# Patient Record
Sex: FEMALE | Race: BLACK OR AFRICAN AMERICAN | NOT HISPANIC OR LATINO | Employment: UNEMPLOYED | URBAN - METROPOLITAN AREA
[De-identification: names, ages, dates, MRNs, and addresses within clinical notes are randomized per-mention and may not be internally consistent; named-entity substitution may affect disease eponyms.]

---

## 2018-08-20 ENCOUNTER — HOSPITAL ENCOUNTER (EMERGENCY)
Facility: HOSPITAL | Age: 28
Discharge: HOME | End: 2018-08-20
Attending: EMERGENCY MEDICINE
Payer: MEDICAID

## 2018-08-20 VITALS
SYSTOLIC BLOOD PRESSURE: 115 MMHG | TEMPERATURE: 99 F | RESPIRATION RATE: 18 BRPM | BODY MASS INDEX: 35.61 KG/M2 | HEIGHT: 68 IN | WEIGHT: 235 LBS | HEART RATE: 61 BPM | DIASTOLIC BLOOD PRESSURE: 70 MMHG | OXYGEN SATURATION: 99 %

## 2018-08-20 DIAGNOSIS — R07.9 CHEST PAIN: ICD-10-CM

## 2018-08-20 DIAGNOSIS — F41.1 GENERALIZED ANXIETY DISORDER: Primary | ICD-10-CM

## 2018-08-20 LAB
ALBUMIN SERPL BCP-MCNC: 4.1 G/DL
ALP SERPL-CCNC: 73 U/L
ALT SERPL W/O P-5'-P-CCNC: 9 U/L
ANION GAP SERPL CALC-SCNC: 8 MMOL/L
AST SERPL-CCNC: 15 U/L
BASOPHILS # BLD AUTO: 0.02 K/UL
BASOPHILS NFR BLD: 0.4 %
BILIRUB SERPL-MCNC: 0.4 MG/DL
BUN SERPL-MCNC: 4 MG/DL
CALCIUM SERPL-MCNC: 9.8 MG/DL
CHLORIDE SERPL-SCNC: 105 MMOL/L
CO2 SERPL-SCNC: 25 MMOL/L
CREAT SERPL-MCNC: 0.8 MG/DL
DIFFERENTIAL METHOD: ABNORMAL
EOSINOPHIL # BLD AUTO: 0.2 K/UL
EOSINOPHIL NFR BLD: 4.5 %
ERYTHROCYTE [DISTWIDTH] IN BLOOD BY AUTOMATED COUNT: 13.9 %
EST. GFR  (AFRICAN AMERICAN): >60 ML/MIN/1.73 M^2
EST. GFR  (NON AFRICAN AMERICAN): >60 ML/MIN/1.73 M^2
GLUCOSE SERPL-MCNC: 89 MG/DL
HCT VFR BLD AUTO: 36.1 %
HGB BLD-MCNC: 11.7 G/DL
LYMPHOCYTES # BLD AUTO: 2.3 K/UL
LYMPHOCYTES NFR BLD: 43.2 %
MCH RBC QN AUTO: 27.5 PG
MCHC RBC AUTO-ENTMCNC: 32.4 G/DL
MCV RBC AUTO: 85 FL
MONOCYTES # BLD AUTO: 0.5 K/UL
MONOCYTES NFR BLD: 8.4 %
NEUTROPHILS # BLD AUTO: 2.3 K/UL
NEUTROPHILS NFR BLD: 43.5 %
PLATELET # BLD AUTO: 335 K/UL
PMV BLD AUTO: 9 FL
POTASSIUM SERPL-SCNC: 3.8 MMOL/L
PROT SERPL-MCNC: 7.9 G/DL
RBC # BLD AUTO: 4.26 M/UL
SODIUM SERPL-SCNC: 138 MMOL/L
TROPONIN I SERPL DL<=0.01 NG/ML-MCNC: <0.006 NG/ML
WBC # BLD AUTO: 5.33 K/UL

## 2018-08-20 PROCEDURE — 84484 ASSAY OF TROPONIN QUANT: CPT

## 2018-08-20 PROCEDURE — 93005 ELECTROCARDIOGRAM TRACING: CPT

## 2018-08-20 PROCEDURE — 93010 ELECTROCARDIOGRAM REPORT: CPT | Mod: ,,, | Performed by: INTERNAL MEDICINE

## 2018-08-20 PROCEDURE — 99284 EMERGENCY DEPT VISIT MOD MDM: CPT | Mod: 25

## 2018-08-20 PROCEDURE — 85025 COMPLETE CBC W/AUTO DIFF WBC: CPT

## 2018-08-20 PROCEDURE — 80053 COMPREHEN METABOLIC PANEL: CPT

## 2018-08-20 RX ORDER — SERTRALINE HYDROCHLORIDE 50 MG/1
50 TABLET, FILM COATED ORAL DAILY
COMMUNITY

## 2018-08-20 RX ORDER — BUSPIRONE HYDROCHLORIDE 10 MG/1
10 TABLET ORAL 3 TIMES DAILY
COMMUNITY

## 2018-08-20 RX ORDER — ALPRAZOLAM 0.5 MG/1
0.5 TABLET ORAL 3 TIMES DAILY
COMMUNITY

## 2018-08-20 RX ORDER — PROPRANOLOL HYDROCHLORIDE 10 MG/1
10 TABLET ORAL 3 TIMES DAILY
COMMUNITY

## 2018-08-20 NOTE — ED TRIAGE NOTES
Patient presents to the ED via personal vehicle alone. Patient reports chest pain since last night, and sob since this morning. Pt reports a 7/10, constant, left sided, CP that gets worse with breathing in, and radiates to left shoulder. Patient states that she feels dizzy. Denies nausea, vomiting. Patient states that she has a hx of anxiety and recently started taking new medications a couple of days ago.

## 2018-08-20 NOTE — DISCHARGE INSTRUCTIONS
"Return to the Emergency Department of any acute worsening of your symptoms or for any other concern.     You should return to the ED for fever/chills, shortness of breath, chest pain, weakness or "passing out".     Pt should take all medications as prescribed.    Pt should follow up with PCP as soon as possible.    The risks associated with not taking your medications as prescribed and not following up with your Primary Care doctor or sub specialist includes worsening of your condition, pain, disability, loss of function or livelihood, and death      ELIAS Teresa M.D. 3:46 PM 8/20/2018      Our goal in the emergency department is to always give you outstanding care and exceptional service. You may receive a survey by mail or e-mail in the next week regarding your experience in our ED. We would greatly appreciate your completing and returning the survey. Your feedback provides us with a way to recognize our staff who give very good care and it helps us learn how to improve when your experience was below our aspiration of excellence.     "

## 2018-08-20 NOTE — ED PROVIDER NOTES
Encounter Date: 8/20/2018    SCRIBE #1 NOTE: I, Jagdeep Sanderson, am scribing for, and in the presence of,  Christopher Teresa MD. I have scribed the following portions of the note - Other sections scribed: HPI, ROS.       History     Chief Complaint   Patient presents with    Chest Pain     Pt reports sudden onset of dizziness and chest pain. Reports she has panic attacks but this does not feel the same.      CC: Chest Pain    HPI: 27 y/o female with hx of anxiety presents for emergent consideration of chest pain that began last night. Pt reports that she typically takes xanax before bed and wakes up feeling better. However, after going to sleep last night with this pain she woke up still having it. She also notes sudden onset of dizziness when trying to stand up. Pt states the pain is exacerbated by deep inhalation. She does not attribute her current symptoms to an anxiety attack, stating she does not have chest pain during these episodes. The pt also notes that she typically paces or uses breathing techniques to relieve her anxiety. Pt denies personal hx of drug use. Pt denies diaphoresis and SOB. Pain is reproducible on palpation of the chest wall.       The history is provided by the patient. No  was used.     Review of patient's allergies indicates:  No Known Allergies  Past Medical History:   Diagnosis Date    Anxiety      Past Surgical History:   Procedure Laterality Date    INCISION AND DRAINAGE OF WOUND      bilateral auxillary     History reviewed. No pertinent family history.  Social History     Tobacco Use    Smoking status: Never Smoker   Substance Use Topics    Alcohol use: Yes     Comment: occasionally    Drug use: No     Review of Systems   Constitutional: Negative for chills and fever.   HENT: Negative for congestion, ear pain, rhinorrhea and sore throat.    Eyes: Negative for pain.   Respiratory: Negative for cough and shortness of breath.    Cardiovascular: Positive for  chest pain.   Gastrointestinal: Negative for abdominal pain, diarrhea, nausea and vomiting.   Genitourinary: Negative for dysuria, flank pain and urgency.   Musculoskeletal: Negative for back pain and neck pain.   Skin: Negative for rash.   Neurological: Positive for dizziness. Negative for weakness, light-headedness and headaches.   All other systems reviewed and are negative.      Physical Exam     Initial Vitals [08/20/18 1331]   BP Pulse Resp Temp SpO2   111/75 78 18 98.2 °F (36.8 °C) 100 %      MAP       --         Physical Exam    Vitals reviewed.  Constitutional: She appears well-developed and well-nourished.   HENT:   Head: Normocephalic and atraumatic.   Nose: Nose normal.   Mouth/Throat: No oropharyngeal exudate.   Eyes: EOM are normal. Pupils are equal, round, and reactive to light.   Neck: Normal range of motion. Neck supple. No JVD present.   Cardiovascular: Regular rhythm and normal heart sounds. Exam reveals no gallop and no friction rub.    No murmur heard.  Pulmonary/Chest: Breath sounds normal. No stridor. No respiratory distress. She has no wheezes. She has no rhonchi. She has no rales. She exhibits tenderness.   Abdominal: Soft. Bowel sounds are normal. She exhibits no distension and no mass. There is no tenderness. There is no rebound and no guarding.   Musculoskeletal: Normal range of motion. She exhibits no edema or tenderness.   Neurological: She is alert and oriented to person, place, and time. She has normal strength. No sensory deficit.   Skin: Skin is warm and dry.   Psychiatric: Her speech is normal and behavior is normal. Thought content normal. Her mood appears anxious. Cognition and memory are normal.         ED Course   Procedures  Labs Reviewed   CBC W/ AUTO DIFFERENTIAL - Abnormal; Notable for the following components:       Result Value    Hemoglobin 11.7 (*)     Hematocrit 36.1 (*)     MPV 9.0 (*)     All other components within normal limits   COMPREHENSIVE METABOLIC PANEL -  Abnormal; Notable for the following components:    BUN, Bld 4 (*)     ALT 9 (*)     All other components within normal limits   TROPONIN I   POCT URINE PREGNANCY     EKG Readings: (Independently Interpreted)   Initial Reading: No STEMI. Rhythm: Normal Sinus Rhythm. Ectopy: No Ectopy. Conduction: Normal. ST Segments: Normal ST Segments. T Waves: Normal. Clinical Impression: Normal Sinus Rhythm       Imaging Results          X-Ray Chest PA And Lateral (Final result)  Result time 08/20/18 14:21:16    Final result by Cullen Mayo MD (08/20/18 14:21:16)                 Impression:      No acute chest disease identified.      Electronically signed by: Cullen Mayo MD  Date:    08/20/2018  Time:    14:21             Narrative:    EXAMINATION:  XR CHEST PA AND LATERAL    CLINICAL HISTORY:  Chest pain, unspecified    TECHNIQUE:  PA and lateral views of the chest were performed.    COMPARISON:  None    FINDINGS:  The cardiac silhouette and superior mediastinal structures are unremarkable. Pulmonary vasculature is within normal limits. The lungs are well aerated and free of focal consolidations. There is no evidence for pneumothorax or pleural effusions. Bony structures are grossly intact.                              X-Rays:   Independently Interpreted Readings:   Chest X-Ray: Normal heart size.  No infiltrates.  No acute abnormalities.     Medical Decision Making:   History:   Old Medical Records: I decided to obtain old medical records.  Initial Assessment:   Medical decision-making:    The patient received a medical screening exam. If performed, the EKG was independently evaluated by me and is pending final cardiology evaluation.  If performed, all radiographic studies were independently evaluated by me and are pending final radiology evaluation. If labs were ordered, they were reviewed. Vital signs are independently assessed by me.  If performed, the pulse oximetry was independently evaluated by me.  I decided to  obtain the patient's past medical record.  If available, I reviewed the patient's past medical record, including most recent labs and radiology reports.    ED Management:  This is an emergent evaluation for a patient with atypical chest pain and anxiety.The patient's pain is atypical for cardiac etiology.  I decided to obtain and review the patient's past medical record.        The vital signs are stable in the room.    EKG is normal.  There is no evidence of STEMI or ischemia.    CXR is negative for pneumonia, pneumothorax and edema.  Troponin is negative and was drawn at least 8 hours since the onset of pain.  I doubt ACS.  There is no evidence of congestive heart failure.  The electrolytes are relatively normal.  The pt is not anemic.  I doubt PE.  I consider but doubt pulmonary embolism.  Patient is PERC negative with a Wells score of zero.     Currently the patient has a a non-diagnostic EKG with negative troponin in the emergency department.  I doubt acute coronary syndrome.  I did inform the patient that even with negative testing, we can never eliminate all risk.  I believe the patient is low risk with negative initial testing; they are appropriate for close outpatient follow-up.  The patient is aware of the small but persistent risk for MI/ACS with subsequent cardiac complications or death.  I have low suspicion for cardiopulmonary, vascular, infectious, respiratory, or other emergent medical condition based on my evaluation in the ED.     The patient's pain is currently improved.      The results and physical exam findings were reviewed with the patient. Pt agrees with assessment, disposition and treatment plan and has no further questions or complaints at this time.      ELIAS Teresa M.D. 3:47 PM 8/20/2018              Scribe Attestation:   Scribe #1: I performed the above scribed service and the documentation accurately describes the services I performed. I attest to the accuracy of the  note.    Attending Attestation:           Physician Attestation for Scribe:  Physician Attestation Statement for Scribe #1: I, Christopher Teresa MD, reviewed documentation, as scribed by Jagdeep Sanderson in my presence, and it is both accurate and complete.                    Clinical Impression:   The primary encounter diagnosis was Generalized anxiety disorder. A diagnosis of Atypical Chest pain was also pertinent to this visit.                             Christopher Teresa MD  08/20/18 1548       Christopher Teresa MD  08/20/18 1542

## 2019-05-01 ENCOUNTER — HOSPITAL ENCOUNTER (EMERGENCY)
Facility: HOSPITAL | Age: 29
Discharge: HOME | End: 2019-05-01
Attending: EMERGENCY MEDICINE
Payer: MEDICAID

## 2019-05-01 VITALS
BODY MASS INDEX: 38.34 KG/M2 | HEART RATE: 77 BPM | HEIGHT: 68 IN | WEIGHT: 253 LBS | RESPIRATION RATE: 18 BRPM | SYSTOLIC BLOOD PRESSURE: 120 MMHG | DIASTOLIC BLOOD PRESSURE: 61 MMHG | OXYGEN SATURATION: 99 % | TEMPERATURE: 97 F

## 2019-05-01 DIAGNOSIS — F41.9 ANXIETY: ICD-10-CM

## 2019-05-01 DIAGNOSIS — R07.89 BURNING CHEST PAIN: Primary | ICD-10-CM

## 2019-05-01 LAB
B-HCG UR QL: NEGATIVE
CTP QC/QA: YES

## 2019-05-01 PROCEDURE — 93005 ELECTROCARDIOGRAM TRACING: CPT

## 2019-05-01 PROCEDURE — 93010 ELECTROCARDIOGRAM REPORT: CPT | Mod: ,,, | Performed by: INTERNAL MEDICINE

## 2019-05-01 PROCEDURE — 99284 EMERGENCY DEPT VISIT MOD MDM: CPT | Mod: 25

## 2019-05-01 PROCEDURE — 93010 EKG 12-LEAD: ICD-10-PCS | Mod: ,,, | Performed by: INTERNAL MEDICINE

## 2019-05-01 PROCEDURE — 25000003 PHARM REV CODE 250: Performed by: PHYSICIAN ASSISTANT

## 2019-05-01 PROCEDURE — 81025 URINE PREGNANCY TEST: CPT | Performed by: NURSE PRACTITIONER

## 2019-05-01 RX ORDER — ALPRAZOLAM 0.25 MG/1
0.25 TABLET ORAL
Status: COMPLETED | OUTPATIENT
Start: 2019-05-01 | End: 2019-05-01

## 2019-05-01 RX ORDER — FAMOTIDINE 20 MG/1
20 TABLET, FILM COATED ORAL 2 TIMES DAILY
Qty: 20 TABLET | Refills: 0 | Status: SHIPPED | OUTPATIENT
Start: 2019-05-01 | End: 2020-04-30

## 2019-05-01 RX ORDER — HYDROXYZINE HYDROCHLORIDE 25 MG/1
25 TABLET, FILM COATED ORAL 3 TIMES DAILY
COMMUNITY

## 2019-05-01 RX ADMIN — LIDOCAINE HYDROCHLORIDE: 20 SOLUTION ORAL; TOPICAL at 01:05

## 2019-05-01 RX ADMIN — ALPRAZOLAM 0.25 MG: 0.25 TABLET ORAL at 01:05

## 2019-05-01 NOTE — ED TRIAGE NOTES
Patient arrived via EMS, reports headache x 2 days, difficulty breathing, weakness. Has hx of anxiety, took Hydroxyzine 30 minutes PTA EMS arrival. States she has been out of her regular anxiety meds for a while. Normally takes Zoloft, Wellbutrin, and Xananx.

## 2019-05-01 NOTE — ED PROVIDER NOTES
Encounter Date: 5/1/2019       History     Chief Complaint   Patient presents with    Anxiety     ANXIETY ATTACK STARTING THIS MORNING, HAS BEEN OUT OF MEDS FOR A WHILE AND IS UNDER ALOT OF STRESS      29 y/o female with history of anxiety presents to the ER with chief complaint of intermittent anxiety for 2-3 months.  She reports chest heaviness that occurred when she was laying down.  She had shortness of breath for 3 hours, but this has now resolved.    She says she attempted breathing methods and took hydroxyzine.  She says she felt calm for a short period of time.  She says her family and fiance were not answering their found so she felt more panicked.    She is a  and feels stressed with work and also has 5 kids at home.  She is prescribed sertraline daily and hydroxyzine prn. , but found that her symptoms were better controlled when she used xanax .25 as needed.    She describes burning pain in the chest and cramping         Review of patient's allergies indicates:  No Known Allergies  Past Medical History:   Diagnosis Date    Anxiety      Past Surgical History:   Procedure Laterality Date    INCISION AND DRAINAGE OF WOUND      bilateral auxillary     History reviewed. No pertinent family history.  Social History     Tobacco Use    Smoking status: Never Smoker   Substance Use Topics    Alcohol use: Yes     Comment: occasionally    Drug use: No     Review of Systems   Constitutional: Negative for chills and fever.   HENT: Negative for sore throat.    Respiratory: Positive for shortness of breath. Negative for chest tightness and wheezing.    Cardiovascular: Positive for chest pain (chest pressure and burning ).   Gastrointestinal: Negative for abdominal pain, nausea and vomiting.   Genitourinary: Negative for dysuria.   Musculoskeletal: Positive for myalgias. Negative for back pain.   Skin: Negative for rash.   Neurological: Negative for weakness.   Hematological: Does not bruise/bleed easily.    Psychiatric/Behavioral: Negative for confusion, self-injury, sleep disturbance and suicidal ideas. The patient is nervous/anxious.        Physical Exam     Initial Vitals [05/01/19 1013]   BP Pulse Resp Temp SpO2   (!) 140/80 96 20 98.8 °F (37.1 °C) 100 %      MAP       --         Physical Exam    Nursing note and vitals reviewed.  Constitutional: She appears well-developed and well-nourished.   HENT:   Head: Atraumatic.   Mouth/Throat: Oropharynx is clear and moist.   Eyes: Conjunctivae and EOM are normal. Pupils are equal, round, and reactive to light.   Neck: Normal range of motion. Neck supple.   Cardiovascular: Normal rate, regular rhythm and intact distal pulses.   Pulmonary/Chest: Breath sounds normal. No respiratory distress. She has no wheezes. She has no rhonchi. She has no rales. She exhibits tenderness.   Abdominal: Soft. Bowel sounds are normal. There is no tenderness.   Neurological: She is alert and oriented to person, place, and time. She has normal strength.   Skin: Capillary refill takes less than 2 seconds. No rash noted.   Psychiatric: Her speech is normal and behavior is normal. Thought content normal. Her mood appears anxious. Her affect is not angry. She is not agitated and not aggressive. Thought content is not paranoid and not delusional. Cognition and memory are normal. She does not express impulsivity. She does not exhibit a depressed mood. She expresses no homicidal and no suicidal ideation.         ED Course   Procedures  Labs Reviewed   POCT URINE PREGNANCY          Imaging Results          X-Ray Chest PA And Lateral (Final result)  Result time 05/01/19 13:43:12    Final result by Gamaliel Dela Cruz MD (05/01/19 13:43:12)                 Impression:      1. No acute cardiopulmonary process.      Electronically signed by: Gamaliel Dela Cruz MD  Date:    05/01/2019  Time:    13:43             Narrative:    EXAMINATION:  XR CHEST PA AND LATERAL    CLINICAL HISTORY:  burning chest  pain;    TECHNIQUE:  PA and lateral views of the chest were performed.    COMPARISON:  08/20/2018    FINDINGS:  The cardiomediastinal silhouette is not enlarged.  There is no pleural effusion.  The trachea is midline.  The lungs are symmetrically expanded bilaterally without evidence of acute parenchymal process. No large focal consolidation seen.  There is no pneumothorax.  The osseous structures are unremarkable.                                       APC / Resident Notes:   Patient presents to the ER with complaint of chest pressure, cramping and burning pain, which occurred today before arrival.  She had shortness of breath, which she felt was similar to anxiety attacks in the past.  SOB has resolved.  The patient did not have significant improvement with hydroxyzine so she came to the ER for evaluation.  Patient appears very comfortable during my exam.  She admits to stress related to her work and home life.  She does not have depression, suicidal or homicidal ideation.  Patient's EKG shows normal sinus rhythm without evidence of acute ischemia.  Chest x-ray is negative for acute or infectious process.  Her presentation is not concerning for pneumonia, PE, ACS or other acute cardiopulmonary process.    I have considered GERD/gastritis, as the patient reports history of GERD and describes a burning chest pain.  Patient's chest pain completely resolved with GI cocktail and Xanax given in the ED.  I will prescribe Pepcid to take at home and advised that she continue Vistaril as needed for anxiety and follow up closely with her PCP who prescribes her medications.  Patient is comfortable with this plan.  She is given ER return precautions.  I discussed the care this patient my supervising physician.         Attending Attestation:             Attending ED Notes:   I, Aby Correa, have reviewed the case with my PAX[facility] and agree with the history, review of systems, physical exam, assessment and plan of care as  documented by my advance practice clinician.  ROS as above and as addressed on PAX[facility] documentation.   All other systems reviewed and are negative.    I did not personally see the patient but was available for consultation.    Labs and imaging studies reviewed and independently Interpreted:  Unremarkable urine pregnancy test and chest x-ray.    The results and physical exam findings were reviewed with the patient. Pt agrees with assessment, disposition and treatment plan and has no further questions or complaints at this time.    MAKEDA Correa M.D.           ED Course as of May 01 1710   Wed May 01, 2019   1438 X-Ray Chest PA And Lateral [NO]   1445 Unremarkable   Preg Test, Ur: Negative [NO]   1445 No acute disease   X-Ray Chest PA And Lateral [NO]      ED Course User Index  [NO] Aby Correa MD     Clinical Impression:       ICD-10-CM ICD-9-CM   1. Burning chest pain R07.89 786.59   2. Anxiety F41.9 300.00                                ANA Alcantara  05/01/19 0130

## 2022-04-19 ENCOUNTER — HOSPITAL ENCOUNTER (EMERGENCY)
Facility: HOSPITAL | Age: 32
Discharge: HOME | End: 2022-04-20
Attending: EMERGENCY MEDICINE
Payer: MEDICAID

## 2022-04-19 DIAGNOSIS — N30.00 ACUTE CYSTITIS WITHOUT HEMATURIA: ICD-10-CM

## 2022-04-19 DIAGNOSIS — O03.4 RETAINED PRODUCTS OF CONCEPTION AFTER MISCARRIAGE: Primary | ICD-10-CM

## 2022-04-19 DIAGNOSIS — E87.6 HYPOKALEMIA: ICD-10-CM

## 2022-04-19 DIAGNOSIS — N89.8 VAGINAL DISCHARGE: ICD-10-CM

## 2022-04-19 DIAGNOSIS — N76.0 BV (BACTERIAL VAGINOSIS): ICD-10-CM

## 2022-04-19 DIAGNOSIS — R06.02 SOB (SHORTNESS OF BREATH): ICD-10-CM

## 2022-04-19 DIAGNOSIS — R10.2 PELVIC PAIN: ICD-10-CM

## 2022-04-19 DIAGNOSIS — S83.91XA SPRAIN OF RIGHT KNEE, UNSPECIFIED LIGAMENT, INITIAL ENCOUNTER: ICD-10-CM

## 2022-04-19 DIAGNOSIS — F41.9 ANXIETY: ICD-10-CM

## 2022-04-19 DIAGNOSIS — B96.89 BV (BACTERIAL VAGINOSIS): ICD-10-CM

## 2022-04-19 DIAGNOSIS — D64.9 ANEMIA, UNSPECIFIED TYPE: ICD-10-CM

## 2022-04-19 DIAGNOSIS — R07.9 CHEST PAIN: ICD-10-CM

## 2022-04-19 PROCEDURE — 93005 ELECTROCARDIOGRAM TRACING: CPT | Performed by: SPECIALIST

## 2022-04-19 PROCEDURE — 93010 EKG 12-LEAD: ICD-10-PCS | Mod: ,,, | Performed by: SPECIALIST

## 2022-04-19 PROCEDURE — 99285 EMERGENCY DEPT VISIT HI MDM: CPT | Mod: 25

## 2022-04-19 PROCEDURE — 93010 ELECTROCARDIOGRAM REPORT: CPT | Mod: ,,, | Performed by: SPECIALIST

## 2022-04-19 PROCEDURE — 81025 URINE PREGNANCY TEST: CPT | Performed by: EMERGENCY MEDICINE

## 2022-04-20 VITALS
TEMPERATURE: 98 F | RESPIRATION RATE: 20 BRPM | HEART RATE: 87 BPM | BODY MASS INDEX: 38.95 KG/M2 | OXYGEN SATURATION: 100 % | DIASTOLIC BLOOD PRESSURE: 85 MMHG | SYSTOLIC BLOOD PRESSURE: 142 MMHG | WEIGHT: 257 LBS | HEIGHT: 68 IN

## 2022-04-20 LAB
ABO + RH BLD: NORMAL
ALBUMIN SERPL BCP-MCNC: 4 G/DL (ref 3.5–5.2)
ALP SERPL-CCNC: 66 U/L (ref 55–135)
ALT SERPL W/O P-5'-P-CCNC: 16 U/L (ref 10–44)
ANION GAP SERPL CALC-SCNC: 7 MMOL/L (ref 8–16)
AST SERPL-CCNC: 18 U/L (ref 10–40)
B-HCG UR QL: POSITIVE
BACTERIA #/AREA URNS HPF: ABNORMAL /HPF
BASOPHILS # BLD AUTO: 0.03 K/UL (ref 0–0.2)
BASOPHILS NFR BLD: 0.5 % (ref 0–1.9)
BILIRUB SERPL-MCNC: 0.3 MG/DL (ref 0.1–1)
BILIRUB UR QL STRIP: NEGATIVE
BUN SERPL-MCNC: 6 MG/DL (ref 6–20)
CALCIUM SERPL-MCNC: 9.2 MG/DL (ref 8.7–10.5)
CHLORIDE SERPL-SCNC: 104 MMOL/L (ref 95–110)
CLARITY UR: ABNORMAL
CLUE CELLS VAG QL WET PREP: ABNORMAL
CO2 SERPL-SCNC: 26 MMOL/L (ref 23–29)
COLOR UR: YELLOW
CREAT SERPL-MCNC: 0.7 MG/DL (ref 0.5–1.4)
CTP QC/QA: YES
DIFFERENTIAL METHOD: ABNORMAL
EOSINOPHIL # BLD AUTO: 0.4 K/UL (ref 0–0.5)
EOSINOPHIL NFR BLD: 7.1 % (ref 0–8)
ERYTHROCYTE [DISTWIDTH] IN BLOOD BY AUTOMATED COUNT: 13.6 % (ref 11.5–14.5)
EST. GFR  (AFRICAN AMERICAN): >60 ML/MIN/1.73 M^2
EST. GFR  (NON AFRICAN AMERICAN): >60 ML/MIN/1.73 M^2
FILAMENT FUNGI VAG WET PREP-#/AREA: ABNORMAL
GLUCOSE SERPL-MCNC: 93 MG/DL (ref 70–110)
GLUCOSE UR QL STRIP: NEGATIVE
HCG INTACT+B SERPL-ACNC: 222 MIU/ML
HCT VFR BLD AUTO: 34.5 % (ref 37–48.5)
HGB BLD-MCNC: 11 G/DL (ref 12–16)
HGB UR QL STRIP: ABNORMAL
HYALINE CASTS #/AREA URNS LPF: 32 /LPF
IMM GRANULOCYTES # BLD AUTO: 0.01 K/UL (ref 0–0.04)
IMM GRANULOCYTES NFR BLD AUTO: 0.2 % (ref 0–0.5)
KETONES UR QL STRIP: NEGATIVE
LEUKOCYTE ESTERASE UR QL STRIP: ABNORMAL
LYMPHOCYTES # BLD AUTO: 3.1 K/UL (ref 1–4.8)
LYMPHOCYTES NFR BLD: 51.4 % (ref 18–48)
MCH RBC QN AUTO: 27.1 PG (ref 27–31)
MCHC RBC AUTO-ENTMCNC: 31.9 G/DL (ref 32–36)
MCV RBC AUTO: 85 FL (ref 82–98)
MICROSCOPIC COMMENT: ABNORMAL
MONOCYTES # BLD AUTO: 0.5 K/UL (ref 0.3–1)
MONOCYTES NFR BLD: 8.6 % (ref 4–15)
NEUTROPHILS # BLD AUTO: 1.9 K/UL (ref 1.8–7.7)
NEUTROPHILS NFR BLD: 32.2 % (ref 38–73)
NITRITE UR QL STRIP: NEGATIVE
NRBC BLD-RTO: 0 /100 WBC
PH UR STRIP: 6 [PH] (ref 5–8)
PLATELET # BLD AUTO: 390 K/UL (ref 150–450)
PMV BLD AUTO: 8.8 FL (ref 9.2–12.9)
POTASSIUM SERPL-SCNC: 3.3 MMOL/L (ref 3.5–5.1)
PROT SERPL-MCNC: 7.7 G/DL (ref 6–8.4)
PROT UR QL STRIP: ABNORMAL
RBC # BLD AUTO: 4.06 M/UL (ref 4–5.4)
RBC #/AREA URNS HPF: 2 /HPF (ref 0–4)
SARS-COV-2 RDRP RESP QL NAA+PROBE: NEGATIVE
SODIUM SERPL-SCNC: 137 MMOL/L (ref 136–145)
SP GR UR STRIP: 1.03 (ref 1–1.03)
SPECIMEN SOURCE: ABNORMAL
SQUAMOUS #/AREA URNS HPF: 6 /HPF
T VAGINALIS GENITAL QL WET PREP: ABNORMAL
TROPONIN I SERPL DL<=0.01 NG/ML-MCNC: <0.03 NG/ML
URN SPEC COLLECT METH UR: ABNORMAL
UROBILINOGEN UR STRIP-ACNC: ABNORMAL EU/DL
WBC # BLD AUTO: 6.03 K/UL (ref 3.9–12.7)
WBC #/AREA URNS HPF: 22 /HPF (ref 0–5)
YEAST GENITAL QL WET PREP: ABNORMAL

## 2022-04-20 PROCEDURE — 96372 THER/PROPH/DIAG INJ SC/IM: CPT | Performed by: EMERGENCY MEDICINE

## 2022-04-20 PROCEDURE — 87205 SMEAR GRAM STAIN: CPT | Performed by: EMERGENCY MEDICINE

## 2022-04-20 PROCEDURE — 63600175 PHARM REV CODE 636 W HCPCS: Performed by: EMERGENCY MEDICINE

## 2022-04-20 PROCEDURE — 87591 N.GONORRHOEAE DNA AMP PROB: CPT | Performed by: EMERGENCY MEDICINE

## 2022-04-20 PROCEDURE — 87491 CHLMYD TRACH DNA AMP PROBE: CPT | Performed by: EMERGENCY MEDICINE

## 2022-04-20 PROCEDURE — 84484 ASSAY OF TROPONIN QUANT: CPT | Performed by: EMERGENCY MEDICINE

## 2022-04-20 PROCEDURE — 86901 BLOOD TYPING SEROLOGIC RH(D): CPT | Performed by: EMERGENCY MEDICINE

## 2022-04-20 PROCEDURE — 80053 COMPREHEN METABOLIC PANEL: CPT | Performed by: EMERGENCY MEDICINE

## 2022-04-20 PROCEDURE — 25000003 PHARM REV CODE 250: Performed by: EMERGENCY MEDICINE

## 2022-04-20 PROCEDURE — 85025 COMPLETE CBC W/AUTO DIFF WBC: CPT | Performed by: EMERGENCY MEDICINE

## 2022-04-20 PROCEDURE — U0002 COVID-19 LAB TEST NON-CDC: HCPCS | Performed by: EMERGENCY MEDICINE

## 2022-04-20 PROCEDURE — 87210 SMEAR WET MOUNT SALINE/INK: CPT | Performed by: EMERGENCY MEDICINE

## 2022-04-20 PROCEDURE — 96372 THER/PROPH/DIAG INJ SC/IM: CPT

## 2022-04-20 PROCEDURE — 87086 URINE CULTURE/COLONY COUNT: CPT | Performed by: EMERGENCY MEDICINE

## 2022-04-20 PROCEDURE — 36415 COLL VENOUS BLD VENIPUNCTURE: CPT | Performed by: EMERGENCY MEDICINE

## 2022-04-20 PROCEDURE — 81001 URINALYSIS AUTO W/SCOPE: CPT | Performed by: EMERGENCY MEDICINE

## 2022-04-20 PROCEDURE — 87081 CULTURE SCREEN ONLY: CPT | Performed by: EMERGENCY MEDICINE

## 2022-04-20 PROCEDURE — 84702 CHORIONIC GONADOTROPIN TEST: CPT | Performed by: EMERGENCY MEDICINE

## 2022-04-20 RX ORDER — METHYLERGONOVINE MALEATE 0.2 MG/ML
200 INJECTION INTRAVENOUS ONCE
Status: COMPLETED | OUTPATIENT
Start: 2022-04-20 | End: 2022-04-20

## 2022-04-20 RX ORDER — NAPROXEN 500 MG/1
500 TABLET ORAL 2 TIMES DAILY WITH MEALS
Qty: 20 TABLET | Refills: 0 | Status: SHIPPED | OUTPATIENT
Start: 2022-04-20 | End: 2022-04-30

## 2022-04-20 RX ORDER — CEPHALEXIN 500 MG/1
500 CAPSULE ORAL EVERY 6 HOURS
Qty: 28 CAPSULE | Refills: 0 | Status: SHIPPED | OUTPATIENT
Start: 2022-04-20 | End: 2022-04-27

## 2022-04-20 RX ORDER — ACETAMINOPHEN 500 MG
1000 TABLET ORAL
Status: COMPLETED | OUTPATIENT
Start: 2022-04-20 | End: 2022-04-20

## 2022-04-20 RX ORDER — METRONIDAZOLE 500 MG/1
500 TABLET ORAL 3 TIMES DAILY
Qty: 21 TABLET | Refills: 0 | Status: SHIPPED | OUTPATIENT
Start: 2022-04-20 | End: 2022-04-27

## 2022-04-20 RX ADMIN — ACETAMINOPHEN 1000 MG: 500 TABLET, FILM COATED ORAL at 01:04

## 2022-04-20 RX ADMIN — METHYLERGONOVINE MALEATE 200 MCG: 0.2 INJECTION, SOLUTION INTRAMUSCULAR; INTRAVENOUS at 03:04

## 2022-04-20 NOTE — DISCHARGE INSTRUCTIONS
Dr. Osullivan once you to go to the outpatient lab and have a beta hCG drawn on Friday.  I have ordered this as an outpatient lab test that can be done at [facility].  Call his office this morning, Wednesday morning, and make an appointment for next Tuesday for follow-up after an incomplete miscarriage with retained products of conception.  Please return to the ER for any new worsening symptoms such as fevers, worsening abdominal pain or pelvic pain, purulent discharge from the vagina or severe bleeding.  Or if you are feeling weak or dizzy.    You have also been prescribed Flagyl and Keflex to take for bacterial vaginosis and UTI.

## 2022-04-20 NOTE — ED NOTES
"Patient c/o foul smelling vaginal discharge. States discharge is white and smells like "spoiled milk".  States she had a miscarriage approx 2 weeks ago and vaginal bleeding stopped 4 days ago.  Patient admits to decreased appetite and intermittent epigastric pain.  Patient states she also wants her left knee checked.   Mild swelling noted to left knee.   "

## 2022-04-20 NOTE — ED PROVIDER NOTES
Encounter Date: 2022       History     Chief Complaint   Patient presents with    Chest Pain     X 2 weeks intermittent    Knee Pain     Right knee pain    Urinary Frequency     Foul odor to urine, no dysuria, miscarriage 2 weeks ago     31-year-old female  approx 3 months pregnant who believes herself to have had a miscarriage 2 weeks ago due to having a large amount of vaginal bleeding and passage of tissue.  Since that time the bleeding has decreased.  But she has noticed ongoing vaginal discharge with a foul odor is if it is not like spilled milk.  She reports she is having lower abdominal discomfort.  As well as vaginal pressure and discomfort.  She denies any active bleeding.  She denies any nausea or vomiting no fevers chills or sweats.  She reports she had a positive UPT in January.  Never saw OBGYN to confirm the pregnancy.  And never had ultrasound or blood work performed.  She also reports she has been having chest pain episodes that happened to her multiple times a day in her sharp in the left chest so typically lasts approximately 1 minute.  She reports associated with anxiety.  And she has this very frequently.  She reports when she takes deep breaths in calms down or takes a walk or stretcher chest that symptoms will improve.  She also reports intermittently feeling short of breath with most recent episode of feeling short of breath 1 week ago.  Most recent chest pain being driving to the ER.  She also complains of right knee pain that has been causing diff difficulty with ambulating since December.  At that time she reports she was walking in her right leg slipped out from under her.  She has noticed right knee swelling and pain with range of motion particularly when trying to flex the knee to walk stairs.  She has a stable gait.  She reports she is here in the ER at this time with multiple complaints because she has been living in [place] has been only in the area from 1 month due to  having to renovate her home and came tonight because her son is also being seen for chest pain.         Review of patient's allergies indicates:  No Known Allergies  Past Medical History:   Diagnosis Date    Anxiety      Past Surgical History:   Procedure Laterality Date    INCISION AND DRAINAGE OF WOUND      bilateral auxillary     No family history on file.  Social History     Tobacco Use    Smoking status: Never Smoker   Substance Use Topics    Alcohol use: Yes     Comment: occasionally    Drug use: No     Review of Systems   Constitutional: Negative for activity change, appetite change, chills, diaphoresis, fatigue and fever.   HENT: Negative for congestion, postnasal drip, rhinorrhea and sore throat.    Respiratory: Positive for shortness of breath. Negative for cough, chest tightness, wheezing and stridor.    Cardiovascular: Positive for chest pain and leg swelling. Negative for palpitations.   Gastrointestinal: Negative for abdominal distention, abdominal pain, blood in stool, constipation, diarrhea, nausea and vomiting.   Genitourinary: Positive for menstrual problem, pelvic pain, vaginal bleeding, vaginal discharge and vaginal pain. Negative for difficulty urinating, dysuria, flank pain, frequency, hematuria and urgency.   Musculoskeletal: Positive for arthralgias, gait problem, joint swelling and myalgias. Negative for back pain, neck pain and neck stiffness.   Skin: Negative for color change, pallor, rash and wound.   Neurological: Negative for dizziness, syncope, weakness, light-headedness and headaches.   Hematological: Does not bruise/bleed easily.   Psychiatric/Behavioral: Negative for confusion. The patient is not nervous/anxious.    All other systems reviewed and are negative.      Physical Exam     Initial Vitals [04/19/22 2336]   BP Pulse Resp Temp SpO2   (!) 149/91 89 16 98.1 °F (36.7 °C) 99 %      MAP       --         Physical Exam    Nursing note and vitals reviewed.  Constitutional: She  appears well-developed and well-nourished. She is not diaphoretic. No distress.   HENT:   Head: Normocephalic and atraumatic.   Right Ear: External ear normal.   Left Ear: External ear normal.   Nose: Nose normal.   Mouth/Throat: Oropharynx is clear and moist.   Eyes: Conjunctivae and EOM are normal. Pupils are equal, round, and reactive to light.   Neck: Neck supple. No tracheal deviation present.   Normal range of motion.  Cardiovascular: Normal rate, regular rhythm, normal heart sounds and intact distal pulses. Exam reveals no gallop and no friction rub.    No murmur heard.  Blood pressure 149/91 pulse 89   Pulmonary/Chest: Breath sounds normal. No stridor. No respiratory distress. She has no wheezes. She has no rhonchi. She has no rales. She exhibits no tenderness.   Clear breath sounds bilaterally sats 99% on room air respirations 16   Abdominal: Abdomen is soft. Bowel sounds are normal. She exhibits no distension and no mass. There is abdominal tenderness.   Mild suprapubic tenderness There is no rebound and no guarding.   Genitourinary:    Pelvic exam was performed with patient supine.   There is no rash, tenderness, lesion or injury on the right labia. There is no rash, tenderness, lesion or injury on the left labia. Uterus is not enlarged and not tender. Cervix exhibits discharge. Cervix exhibits no motion tenderness and no friability. Right adnexum displays no mass, no tenderness and no fullness. Left adnexum displays no mass, no tenderness and no fullness.    Vaginal discharge present.      No vaginal erythema, tenderness or bleeding.   No erythema, tenderness or bleeding in the vagina.    No foreign body in the vagina.      No signs of injury in the vagina.      Genitourinary Comments: Patient has a thin white to tan with mild signs of old blood discharge in the vaginal vault.  Cervix is closed.  No cervical motion tenderness.  No adnexal tenderness or fullness.     Musculoskeletal:         General:  Tenderness and edema present.      Cervical back: Normal range of motion and neck supple.      Right knee: Swelling present. No deformity, effusion, erythema, ecchymosis, lacerations, bony tenderness or crepitus. Decreased range of motion. Tenderness present over the medial joint line, lateral joint line and patellar tendon. Normal alignment and normal meniscus.      Instability Tests: Anterior drawer test negative. Posterior drawer test negative. Anterior Lachman test negative. Medial Chencho test negative and lateral Chencho test negative.      Left knee: Normal.        Legs:      Neurological: She is alert and oriented to person, place, and time. She has normal strength. No cranial nerve deficit or sensory deficit.   Skin: Skin is warm and dry. No rash noted. No erythema. No pallor.   Psychiatric: She has a normal mood and affect. Her behavior is normal. Judgment and thought content normal.         ED Course   Procedures  Labs Reviewed   VAGINAL SCREEN - Abnormal; Notable for the following components:       Result Value    Clue Cells Few (*)     All other components within normal limits    Narrative:     Release to patient->Immediate   URINALYSIS, REFLEX TO URINE CULTURE - Abnormal; Notable for the following components:    Appearance, UA Hazy (*)     Protein, UA Trace (*)     Occult Blood UA 1+ (*)     Urobilinogen, UA 2.0-3.0 (*)     Leukocytes, UA 2+ (*)     All other components within normal limits    Narrative:     Specimen Source->Urine   CBC W/ AUTO DIFFERENTIAL - Abnormal; Notable for the following components:    Hemoglobin 11.0 (*)     Hematocrit 34.5 (*)     MCHC 31.9 (*)     MPV 8.8 (*)     Gran % 32.2 (*)     Lymph % 51.4 (*)     All other components within normal limits   COMPREHENSIVE METABOLIC PANEL - Abnormal; Notable for the following components:    Potassium 3.3 (*)     Anion Gap 7 (*)     All other components within normal limits   URINALYSIS MICROSCOPIC - Abnormal; Notable for the following  components:    WBC, UA 22 (*)     Hyaline Casts, UA 32 (*)     All other components within normal limits    Narrative:     Specimen Source->Urine   POCT URINE PREGNANCY - Abnormal; Notable for the following components:    POC Preg Test, Ur Positive (*)     All other components within normal limits   CULTURE, GONOCOCCUS   CULTURE, URINE   C. TRACHOMATIS/N. GONORRHOEAE BY AMP DNA   TROPONIN I   HCG, QUANTITATIVE   HCG, QUANTITATIVE   SARS-COV-2 RNA AMPLIFICATION, QUAL   GROUP & RH     EKG Readings: (Independently Interpreted)   Initial Reading: No STEMI. Rhythm: Normal Sinus Rhythm. Heart Rate: 81. Ectopy: No Ectopy. Conduction: Normal.       Imaging Results          US OB <14 Wks TransAbd & TransVag, Single Gestation (XPD) (Final result)  Result time 04/20/22 03:00:40    Final result by Michele Christiansen MD (04/20/22 03:00:40)                 Narrative:    Ultrasound OB less than 14 weeks transabdominal and transvaginal on 4/20/2022    Clinical indications: Vaginal bleeding, history of miscarriage two weeks ago, beta hCG equals 222    COMPARISON: None    FINDINGS: Multiple sonographic images are obtained throughout the pelvis by transabdominal and transvaginal approach, both transverse and sagittal images are obtained. Uterus measures approximately 9.2 x 5.9 x 6.5 cm. The upper endometrium is heterogeneous and thickened with marked area of increased vascularity along the upper endometrium most worrisome for retained products of conception. Recommend appropriate OB consultation. The left ovary measures approximately 5.2 x 4.6 x 2.6 cm. Flow is demonstrated in the left ovary. Right ovary measures approximately 3.2 x 2.1 x 2.0 cm. Flow is demonstrated in the right ovary. Dominant follicles are noted in each ovary. In the left ovary there is an area of hypoechogenicity surrounded by increased echogenicity that could represent dermoid cyst. No other adnexal mass or fluid collection is noted. No free fluid is  noted.    IMPRESSION:  1. Heterogeneous hypervascular thickening of the upper endometrium most worrisome for retained products of conception. Recommend appropriate OB consultation. Dr. Bonner was made aware of this by myself by phone on 2022 at 2:59 AM central time.  2. Heterogeneous abnormality in the left ovary that could represent left ovarian dermoid, consider follow-up nonemergent CT or MRI.    Electronically signed by:  Gustavo Christiansen  2022 3:00 AM CDT Workstation: [id number]                             X-Ray Chest AP Portable (In process)               X-Rays:   Independently Interpreted Readings:   Chest X-Ray: Normal heart size.  No infiltrates.  No acute abnormalities.     Medications   methylergonovine injection 200 mcg (has no administration in time range)   acetaminophen tablet 1,000 mg (1,000 mg Oral Given 22 0135)     Medical Decision Making:   Independently Interpreted Test(s):   I have ordered and independently interpreted X-rays - see prior notes.  I have ordered and independently interpreted EKG Reading(s) - see prior notes  Clinical Tests:   Lab Tests: Ordered and Reviewed  The following lab test(s) were unremarkable: Troponin       <> Summary of Lab: UPT positive  Beta quant 222  Urine is hazy with trace protein 1+ blood, 2-3 urobilinogen 2+ leukocytes, 2 red blood cells, 22 white blood cells rare bacteria 3 squamous cells 32 hyaline cast    H&H 11 and 34.5  Potassium 3.3  Radiological Study: Ordered and Reviewed  Medical Tests: Ordered and Reviewed  ED Management:  31-year-old female  approx 3 months pregnant who believes herself to have had a miscarriage 2 weeks ago due to having a large amount of vaginal bleeding and passage of tissue.  Since that time the bleeding has decreased.  But she has noticed ongoing vaginal discharge with a foul odor is if it is not like spilled milk.  She reports she is having lower abdominal discomfort.  As well as vaginal pressure and  discomfort.  She denies any active bleeding.  She denies any nausea or vomiting no fevers chills or sweats.  She reports she had a positive UPT in January.  Never saw OBGYN to confirm the pregnancy.  And never had ultrasound or blood work performed.  She also reports she has been having chest pain episodes that happened to her multiple times a day in her sharp in the left chest so typically lasts approximately 1 minute.  She reports associated with anxiety.  And she has this very frequently.  She reports when she takes deep breaths in calms down or takes a walk or stretcher chest that symptoms will improve.  She also reports intermittently feeling short of breath with most recent episode of feeling short of breath 1 week ago.  Most recent chest pain being driving to the ER.  She also complains of right knee pain that has been causing diff difficulty with ambulating since December.  At that time she reports she was walking in her right leg slipped out from under her.  She has noticed right knee swelling and pain with range of motion particularly when trying to flex the knee to walk stairs.  She has a stable gait.  She reports she is here in the ER at this time with multiple complaints because she has been living in [place] has been only in the area from 1 month due to having to renovate her home and came tonight because her son is also being seen for chest pain.  On physical exam blood pressure is mildly elevated 149/91 other vitals are normal  She has no reproducible chest pain clear breath sounds normal cardiac exam.  Her EKG no abnormalities rate of 81. Chest x-ray revealed no abnormalities.  Troponin was negative  As well her 2nd complaint of right knee pain she had tenderness to the soft tissues medially laterally and superior to the patella at the patellar tendon.  No bony tenderness.  Mild swelling.  No crepitus or effusion.  Symptoms have been present for 4 months and she was able to ambulate with a normal  gait.  I do not suspect a fracture at this time.  She will be placed in a knee immobilizer.  And will be given Tylenol for pain  As for her concern for vaginal discharge and foul odor with pelvic pain and pressure with concern for a miscarriage 2 weeks ago with no confirmed intrauterine pregnancy or confirm miscarriage we have done lab work here.  UPT was positive.  Beta quant was only 222. Patient's H&H is 11 and 34.5 with a normal white count.  Her urinalysis revealed 1+ blood 2+ leukocytes 2 red blood cells and 22 white blood cells .  This may be UTI but on pelvic exam patient had a moderate amount of vaginal discharge that was a thin white to tannish color with scant blood.  She had a pelvic ultrasound and transabdominal ultrasound revealed a large left dermoid cyst with no signs of ovarian torsion on the left.  And this did not appear to be an ectopic pregnancy.  And signs of a missed  with retained products of conception in the uterus.  Since she believes herself to be approximately 3 months pregnant and had bleeding 2 weeks ago that rapidly tapered off she possibly has retained products for more than 2 weeks.  Which may explain why she is having pelvic pain and foul-smelling vaginal discharge.  I will wait for the official ultrasound read a and discussed with on-call OBGYN why in Dr. Osullivan .  Patient does not have an OBGYN here.  And does not have ability to get close follow-up.  Laury Bonner M.D.  2:14 AM 2022    Other:   I discussed test(s) with the performing physician.       <> Summary of the Findings: Dr Christiansen, radiologist about US findings   I have discussed this case with another health care provider.       <> Summary of the Discussion: Dr Osullivan- recommends patient be given Methergine 200 mcg IM and discharged home with a repeat beta hCG on  and follow-up in his clinic on . Patient reports she will be able to make that appointment.  She has been  instructed to call in the morning to make the appointment.  And return to the ER for any new worsening symptoms including fevers, worsening abdominal pain, increased vaginal discharge particularly if it becomes purulent.  Or any new or worsening symptoms.    She also be discharged home with Keflex for UTI.  And Flagyl due to bacterial vaginosis.   Laury Bonner M.D.  3:15 AM 4/20/2022                            Clinical Impression:   Final diagnoses:  [R07.9] Chest pain  [R06.02] SOB (shortness of breath)  [F41.9] Anxiety  [O03.4] Retained products of conception after miscarriage (Primary)  [N89.8] Vaginal discharge  [R10.2] Pelvic pain  [D64.9] Anemia, unspecified type  [E87.6] Hypokalemia  [S83.91XA] Sprain of right knee, unspecified ligament, initial encounter  [N76.0, B96.89] BV (bacterial vaginosis)  [N30.00] Acute cystitis without hematuria          ED Disposition Condition    Discharge Stable        ED Prescriptions     Medication Sig Dispense Start Date End Date Auth. Provider    cephALEXin (KEFLEX) 500 MG capsule Take 1 capsule (500 mg total) by mouth every 6 (six) hours. for 7 days 28 capsule 4/20/2022 4/27/2022 Laury Bonner MD    metroNIDAZOLE (FLAGYL) 500 MG tablet Take 1 tablet (500 mg total) by mouth 3 (three) times daily. for 7 days 21 tablet 4/20/2022 4/27/2022 Laury Bonner MD    naproxen (NAPROSYN) 500 MG tablet Take 1 tablet (500 mg total) by mouth 2 (two) times daily with meals. for 10 days 20 tablet 4/20/2022 4/30/2022 Laury Bonner MD        Follow-up Information     Follow up With Specialties Details Why Contact Info Additional Information    Danielito Osullivan MD Obstetrics and Gynecology Call today Have your Beta HCG drawn at the out pt lab on 4/22 and call for appt on 4/26/22 [address] [place] [address]  [phone]       [facility] - Emergency Dept Emergency Medicine Go to  If symptoms worsen [address]  [facility] [address]  [phone] 1st floor           Laury Bonner MD  04/20/22 0320

## 2022-04-21 LAB
CHLAMYDIA, AMPLIFIED DNA: NEGATIVE
N GONORRHOEAE, AMPLIFIED DNA: NEGATIVE

## 2022-04-22 LAB
BACTERIA UR CULT: NORMAL
BACTERIA UR CULT: NORMAL

## 2022-04-23 LAB
BACTERIA GENITAL AEROBE CULT: NORMAL
GRAM STN SPEC: NORMAL

## 2022-05-26 ENCOUNTER — HOSPITAL ENCOUNTER (EMERGENCY)
Facility: HOSPITAL | Age: 32
Discharge: HOME | End: 2022-05-27
Attending: EMERGENCY MEDICINE
Payer: MEDICAID

## 2022-05-26 DIAGNOSIS — N20.0 NEPHROLITHIASIS: Primary | ICD-10-CM

## 2022-05-26 DIAGNOSIS — M62.838 MUSCLE SPASM: ICD-10-CM

## 2022-05-26 DIAGNOSIS — R19.00 PELVIC MASS: ICD-10-CM

## 2022-05-26 DIAGNOSIS — R07.9 CHEST PAIN: ICD-10-CM

## 2022-05-26 LAB
ALBUMIN SERPL BCP-MCNC: 4.1 G/DL (ref 3.5–5.2)
ALP SERPL-CCNC: 60 U/L (ref 55–135)
ALT SERPL W/O P-5'-P-CCNC: 11 U/L (ref 10–44)
ANION GAP SERPL CALC-SCNC: 8 MMOL/L (ref 8–16)
AST SERPL-CCNC: 17 U/L (ref 10–40)
B-HCG UR QL: NEGATIVE
BACTERIA #/AREA URNS HPF: ABNORMAL /HPF
BASOPHILS # BLD AUTO: 0.03 K/UL (ref 0–0.2)
BASOPHILS NFR BLD: 0.5 % (ref 0–1.9)
BILIRUB SERPL-MCNC: 0.1 MG/DL (ref 0.1–1)
BILIRUB UR QL STRIP: NEGATIVE
BUN SERPL-MCNC: 7 MG/DL (ref 6–20)
CALCIUM SERPL-MCNC: 9 MG/DL (ref 8.7–10.5)
CHLORIDE SERPL-SCNC: 105 MMOL/L (ref 95–110)
CLARITY UR: ABNORMAL
CO2 SERPL-SCNC: 23 MMOL/L (ref 23–29)
COLOR UR: YELLOW
CREAT SERPL-MCNC: 0.6 MG/DL (ref 0.5–1.4)
CTP QC/QA: YES
DIFFERENTIAL METHOD: ABNORMAL
EOSINOPHIL # BLD AUTO: 0.3 K/UL (ref 0–0.5)
EOSINOPHIL NFR BLD: 4.9 % (ref 0–8)
ERYTHROCYTE [DISTWIDTH] IN BLOOD BY AUTOMATED COUNT: 14 % (ref 11.5–14.5)
EST. GFR  (AFRICAN AMERICAN): >60 ML/MIN/1.73 M^2
EST. GFR  (NON AFRICAN AMERICAN): >60 ML/MIN/1.73 M^2
GLUCOSE SERPL-MCNC: 82 MG/DL (ref 70–110)
GLUCOSE UR QL STRIP: NEGATIVE
HCG INTACT+B SERPL-ACNC: 2 MIU/ML
HCT VFR BLD AUTO: 36.2 % (ref 37–48.5)
HGB BLD-MCNC: 11.2 G/DL (ref 12–16)
HGB UR QL STRIP: ABNORMAL
HYALINE CASTS #/AREA URNS LPF: 10 /LPF
IMM GRANULOCYTES # BLD AUTO: 0.02 K/UL (ref 0–0.04)
IMM GRANULOCYTES NFR BLD AUTO: 0.3 % (ref 0–0.5)
KETONES UR QL STRIP: NEGATIVE
LEUKOCYTE ESTERASE UR QL STRIP: NEGATIVE
LYMPHOCYTES # BLD AUTO: 2.7 K/UL (ref 1–4.8)
LYMPHOCYTES NFR BLD: 42.6 % (ref 18–48)
MCH RBC QN AUTO: 26.9 PG (ref 27–31)
MCHC RBC AUTO-ENTMCNC: 30.9 G/DL (ref 32–36)
MCV RBC AUTO: 87 FL (ref 82–98)
MICROSCOPIC COMMENT: ABNORMAL
MONOCYTES # BLD AUTO: 0.5 K/UL (ref 0.3–1)
MONOCYTES NFR BLD: 8.2 % (ref 4–15)
NEUTROPHILS # BLD AUTO: 2.8 K/UL (ref 1.8–7.7)
NEUTROPHILS NFR BLD: 43.5 % (ref 38–73)
NITRITE UR QL STRIP: NEGATIVE
NRBC BLD-RTO: 0 /100 WBC
PH UR STRIP: 7 [PH] (ref 5–8)
PLATELET # BLD AUTO: 367 K/UL (ref 150–450)
PMV BLD AUTO: 9 FL (ref 9.2–12.9)
POTASSIUM SERPL-SCNC: 3.4 MMOL/L (ref 3.5–5.1)
PROT SERPL-MCNC: 7.8 G/DL (ref 6–8.4)
PROT UR QL STRIP: NEGATIVE
RBC # BLD AUTO: 4.17 M/UL (ref 4–5.4)
RBC #/AREA URNS HPF: 8 /HPF (ref 0–4)
SODIUM SERPL-SCNC: 136 MMOL/L (ref 136–145)
SP GR UR STRIP: 1.02 (ref 1–1.03)
SQUAMOUS #/AREA URNS HPF: 4 /HPF
URN SPEC COLLECT METH UR: ABNORMAL
UROBILINOGEN UR STRIP-ACNC: ABNORMAL EU/DL
WBC # BLD AUTO: 6.32 K/UL (ref 3.9–12.7)
WBC #/AREA URNS HPF: 4 /HPF (ref 0–5)

## 2022-05-26 PROCEDURE — 96360 HYDRATION IV INFUSION INIT: CPT

## 2022-05-26 PROCEDURE — 84484 ASSAY OF TROPONIN QUANT: CPT | Performed by: PHYSICIAN ASSISTANT

## 2022-05-26 PROCEDURE — 93005 ELECTROCARDIOGRAM TRACING: CPT | Performed by: SPECIALIST

## 2022-05-26 PROCEDURE — 86140 C-REACTIVE PROTEIN: CPT | Performed by: PHYSICIAN ASSISTANT

## 2022-05-26 PROCEDURE — 96361 HYDRATE IV INFUSION ADD-ON: CPT

## 2022-05-26 PROCEDURE — 80053 COMPREHEN METABOLIC PANEL: CPT | Performed by: PHYSICIAN ASSISTANT

## 2022-05-26 PROCEDURE — 93010 ELECTROCARDIOGRAM REPORT: CPT | Mod: ,,, | Performed by: SPECIALIST

## 2022-05-26 PROCEDURE — 85025 COMPLETE CBC W/AUTO DIFF WBC: CPT | Performed by: PHYSICIAN ASSISTANT

## 2022-05-26 PROCEDURE — 81025 URINE PREGNANCY TEST: CPT | Performed by: PHYSICIAN ASSISTANT

## 2022-05-26 PROCEDURE — 84702 CHORIONIC GONADOTROPIN TEST: CPT | Performed by: PHYSICIAN ASSISTANT

## 2022-05-26 PROCEDURE — 93010 EKG 12-LEAD: ICD-10-PCS | Mod: ,,, | Performed by: SPECIALIST

## 2022-05-26 PROCEDURE — 81001 URINALYSIS AUTO W/SCOPE: CPT | Performed by: PHYSICIAN ASSISTANT

## 2022-05-26 PROCEDURE — 99285 EMERGENCY DEPT VISIT HI MDM: CPT | Mod: 25

## 2022-05-27 VITALS
RESPIRATION RATE: 22 BRPM | SYSTOLIC BLOOD PRESSURE: 122 MMHG | HEART RATE: 77 BPM | TEMPERATURE: 98 F | DIASTOLIC BLOOD PRESSURE: 73 MMHG | WEIGHT: 265 LBS | HEIGHT: 68 IN | OXYGEN SATURATION: 100 % | BODY MASS INDEX: 40.16 KG/M2

## 2022-05-27 LAB
CRP SERPL-MCNC: 0.31 MG/DL
ERYTHROCYTE [SEDIMENTATION RATE] IN BLOOD BY WESTERGREN METHOD: 10 MM/HR (ref 0–20)
TROPONIN I SERPL DL<=0.01 NG/ML-MCNC: <0.03 NG/ML

## 2022-05-27 PROCEDURE — 36415 COLL VENOUS BLD VENIPUNCTURE: CPT | Performed by: PHYSICIAN ASSISTANT

## 2022-05-27 PROCEDURE — 85651 RBC SED RATE NONAUTOMATED: CPT | Performed by: PHYSICIAN ASSISTANT

## 2022-05-27 PROCEDURE — 25000003 PHARM REV CODE 250: Performed by: PHYSICIAN ASSISTANT

## 2022-05-27 PROCEDURE — 25000003 PHARM REV CODE 250: Performed by: EMERGENCY MEDICINE

## 2022-05-27 RX ORDER — METHOCARBAMOL 500 MG/1
500 TABLET, FILM COATED ORAL 3 TIMES DAILY
Qty: 6 TABLET | Refills: 0 | Status: SHIPPED | OUTPATIENT
Start: 2022-05-27 | End: 2022-05-29

## 2022-05-27 RX ORDER — TAMSULOSIN HYDROCHLORIDE 0.4 MG/1
0.4 CAPSULE ORAL DAILY
Qty: 30 CAPSULE | Refills: 0 | Status: SHIPPED | OUTPATIENT
Start: 2022-05-27 | End: 2022-06-26

## 2022-05-27 RX ADMIN — SODIUM CHLORIDE 1000 ML: 0.9 INJECTION, SOLUTION INTRAVENOUS at 02:05

## 2022-05-27 RX ADMIN — POTASSIUM BICARBONATE 20 MEQ: 391 TABLET, EFFERVESCENT ORAL at 05:05

## 2022-05-27 NOTE — DISCHARGE INSTRUCTIONS
CT Renal Stone 5/27/22  IMPRESSION:  Punctate right midureteral stone with minimal right hydronephrosis and hydroureter. Correlate with urinalysis.  Additional punctate nonobstructive right renal stone.  Fat and soft tissue containing left adnexal lesion with small calcification within it.  Findings most suggestive of ovarian dermoid.  Recommend prompt follow-up with pelvic US. Reference: Radiology 2010 Sep;256(3):943-54    US OB Transabdominal and Transvaginal 4/20/22  IMPRESSION:  1. Heterogeneous hypervascular thickening of the upper endometrium most worrisome for retained products of conception. Recommend appropriate OB consultation. Dr. Bonner was made aware of this by myself by phone on 4/20/2022 at 2:59 AM central time.  2. Heterogeneous abnormality in the left ovary that could represent left ovarian dermoid, consider follow-up nonemergent CT or MRI.

## 2022-05-27 NOTE — ED PROVIDER NOTES
Encounter Date: 5/26/2022       History     Chief Complaint   Patient presents with    Chest Pain     Pt has c/o mid sternal chest pain to left side radiates to the back and her left flank is hurting also the bladder .     HPI   Patient is a 30yo F with PMHx of anxiety who presents with chief complaint of back pain along the left medial scapular border and left anterior chest pain at approximately the same height. Pain is worse with lying back or on her left side, and is improved with sitting up right or laying on her right side. States that this pain is different from her typical anxiety chest pain. She denies SOB, lightheadedness, diaphoresis, or rapid heart beating with her pain. Pain has been constant since onset this morning, worsening with positions as above. States she noticed it earlier while at rest, and denies feeling stressed about anything. No nausea, vomiting, fever. No recent cough, congestion, fever, URI symptoms.    Also reports right-sided flank pain that was present a few days ago, and has now worsened around the right flank and groin. Has right-sided pelvic pain with urination, but at no other times. Has been following with OB/Gyn to treat her UTI symptoms (and for her recent miscarriage, which she states did not require D&C), and recently completed a course of flagyl, bactrim, and then keflex. Symptoms of urinary frequency and urgency have resolved, but she still has right-sided pain with urination. Denies vaginal discharge or bleeding.     Review of patient's allergies indicates:  No Known Allergies  Past Medical History:   Diagnosis Date    Anxiety      Past Surgical History:   Procedure Laterality Date    INCISION AND DRAINAGE OF WOUND      bilateral auxillary     No family history on file.  Social History     Tobacco Use    Smoking status: Never Smoker   Substance Use Topics    Alcohol use: Yes     Comment: occasionally    Drug use: No     Review of Systems   Constitutional: Negative  for chills and fever.   HENT: Negative for congestion and sore throat.    Eyes: Negative for visual disturbance.   Respiratory: Negative for cough and shortness of breath.    Cardiovascular: Positive for chest pain. Negative for palpitations.   Gastrointestinal: Negative for abdominal pain, nausea and vomiting.   Endocrine: Negative for polydipsia and polyuria.   Genitourinary: Positive for flank pain. Negative for decreased urine volume, difficulty urinating, dysuria, frequency, urgency, vaginal bleeding and vaginal discharge.   Musculoskeletal: Positive for myalgias. Negative for back pain.   Skin: Negative for rash and wound.   Neurological: Negative for weakness and light-headedness.   Hematological: Does not bruise/bleed easily.       Physical Exam     Initial Vitals   BP Pulse Resp Temp SpO2   05/26/22 1914 05/26/22 1914 05/26/22 1914 05/26/22 1914 05/26/22 1917   (!) 172/123 90 (!) 22 98.2 °F (36.8 °C) 99 %      MAP       --                Physical Exam    Constitutional: She appears well-developed and well-nourished. No distress.   HENT:   Head: Normocephalic and atraumatic.   Right Ear: External ear normal.   Left Ear: External ear normal.   Mouth/Throat: Oropharynx is clear and moist.   Eyes: Conjunctivae and EOM are normal. Pupils are equal, round, and reactive to light.   Neck: Neck supple.   Normal range of motion.  Cardiovascular: Normal rate, regular rhythm, normal heart sounds and intact distal pulses.   Pulmonary/Chest: Breath sounds normal. No respiratory distress.   Abdominal: Abdomen is soft. Bowel sounds are normal. There is no abdominal tenderness.   Musculoskeletal:         General: No edema. Normal range of motion.      Cervical back: Normal range of motion and neck supple.     Neurological: She is alert and oriented to person, place, and time. GCS score is 15. GCS eye subscore is 4. GCS verbal subscore is 5. GCS motor subscore is 6.   Skin: Skin is warm and dry. Capillary refill takes less  than 2 seconds. No rash noted.   Psychiatric: She has a normal mood and affect. Her behavior is normal.         ED Course   Procedures  Labs Reviewed   URINALYSIS, REFLEX TO URINE CULTURE - Abnormal; Notable for the following components:       Result Value    Appearance, UA Hazy (*)     Occult Blood UA 2+ (*)     Urobilinogen, UA 2.0-3.0 (*)     All other components within normal limits    Narrative:     Specimen Source->Urine   CBC W/ AUTO DIFFERENTIAL - Abnormal; Notable for the following components:    Hemoglobin 11.2 (*)     Hematocrit 36.2 (*)     MCH 26.9 (*)     MCHC 30.9 (*)     MPV 9.0 (*)     All other components within normal limits    Narrative:     Release to patient->Immediate   COMPREHENSIVE METABOLIC PANEL - Abnormal; Notable for the following components:    Potassium 3.4 (*)     All other components within normal limits    Narrative:     Release to patient->Immediate   URINALYSIS MICROSCOPIC - Abnormal; Notable for the following components:    RBC, UA 8 (*)     Hyaline Casts, UA 10 (*)     All other components within normal limits    Narrative:     Specimen Source->Urine   HCG, QUANTITATIVE    Narrative:     Release to patient->Immediate   SEDIMENTATION RATE   C-REACTIVE PROTEIN   TROPONIN I   C-REACTIVE PROTEIN   SEDIMENTATION RATE   TROPONIN I   POCT URINE PREGNANCY   GROUP & RH          Imaging Results          CT Renal Stone Study ABD Pelvis WO (Final result)  Result time 05/27/22 01:27:19    Final result by Matheus Gordon DO (05/27/22 01:27:19)                 Narrative:    PROCEDURE:  CT Abdomen and Pelvis Without Intravenous Contrast    CLINICAL INDICATION:  The patient is 31 years old and is Female; Flank pain, kidney stone suspected    TECHNIQUE:  Axial computed tomography images of the abdomen and pelvis without intravenous contrast.  Sagittal and coronal reformatted images were created and reviewed.  This CT exam was performed using one or more of the following dose reduction  techniques:  automated exposure control, adjustment of the mA and/or kV according to patient size, and/or use of iterative reconstruction technique.    DLP: 1100 mGycm    COMPARISON:  None.    FINDINGS:  LUNG BASES:  Lung bases are clear.  HEART:  Visualized heart is normal.    ABDOMEN:  LIVER:  Unremarkable.  GALLBLADDER AND BILE DUCTS:  Unremarkable.  No calcified stones.  No ductal dilation.  PANCREAS:  Unremarkable.  No ductal dilation.  SPLEEN:  Unremarkable.  No splenomegaly.  ADRENALS:  Unremarkable.  No mass.  KIDNEYS AND URETERS:  Unremarkable.  No obstructing stones.  No hydronephrosis.  STOMACH AND BOWEL:  Unremarkable.  No obstruction.  No mucosal thickening.    PELVIS:  APPENDIX:  The appendix is seen and is within normal limits.  BLADDER:  Bladder is decompressed.  No stones.  REPRODUCTIVE:  Fat and soft tissue containing left adnexal lesion with small calcification within it.    ABDOMEN and PELVIS:  INTRAPERITONEAL SPACE:  Unremarkable.  No free air.  No significant fluid collection.  BONES/JOINTS:  No acute fracture.  No dislocation.  SOFT TISSUES:  Unremarkable.  VASCULATURE:  Unremarkable.  No abdominal aortic aneurysm.  LYMPH NODES:  Unremarkable.  No enlarged lymph nodes.    IMPRESSION:    Punctate right midureteral stone with minimal right hydronephrosis and hydroureter. Correlate with urinalysis.    Additional punctate nonobstructive right renal stone.    Fat and soft tissue containing left adnexal lesion with small calcification within it.  Findings most suggestive of ovarian dermoid.  Recommend prompt follow-up with pelvic US. Reference: Radiology 2010 Sep;256(3):943-54    Electronically signed by:  Matheus Gordon DO  5/27/2022 1:27 AM CDT Workstation: [id number]                               Medications   sodium chloride 0.9% bolus 1,000 mL (0 mLs Intravenous Stopped 5/27/22 4321)   potassium bicarbonate disintegrating tablet 20 mEq (20 mEq Oral Given 5/27/22 0524)                Attending  Attestation:   Physician Attestation Statement for Resident:  As the supervising MD  I agree with the above history. -:   As the supervising MD I agree with the above PE.    As the supervising MD I agree with the above treatment, course, plan, and disposition.  I have reviewed and agree with the residents interpretation of the following: lab data, x-rays, EKG and CT scans.  I have reviewed the following: old records at this facility.                         Clinical Impression:   Final diagnoses:  [R07.9] Chest pain  [R19.00] Pelvic mass  [N20.0] Nephrolithiasis (Primary)  [M62.838] Muscle spasm          ED Disposition Condition    Discharge Stable        ED Prescriptions     Medication Sig Dispense Start Date End Date Auth. Provider    tamsulosin (FLOMAX) 0.4 mg Cap Take 1 capsule (0.4 mg total) by mouth once daily. 30 capsule 5/27/2022 6/26/2022 Anabella Rankin MD    methocarbamoL (ROBAXIN) 500 MG Tab Take 1 tablet (500 mg total) by mouth 3 (three) times daily. You make take 2 tablets at a time for 2 days 6 tablet 5/27/2022 5/29/2022 Anabella Rankin MD        Follow-up Information     Follow up With Specialties Details Why Contact Info Additional Information    [facility] - Emergency Dept Emergency Medicine Go to  If symptoms worsen [address]  [facility] [address]  [phone] 1st floor    Joni Sue MD Urology Schedule an appointment as soon as possible for a visit in 1 week You may also follow up with a Urologist back home [address] [place] [address] DR  [address]  [facility] [address]  [phone]              Neo Mendoza MD  05/27/22 0546       Neo Mendoza MD  06/29/22 4520

## 2022-05-27 NOTE — FIRST PROVIDER EVALUATION
Emergency Department TeleTriage Encounter Note      CHIEF COMPLAINT    Chief Complaint   Patient presents with    Chest Pain     Pt has c/o mid sternal chest pain to left side radiates to the back and her left flank is hurting also the bladder .       VITAL SIGNS   Initial Vitals   BP Pulse Resp Temp SpO2   05/26/22 1914 05/26/22 1914 05/26/22 1914 05/26/22 1914 05/26/22 1917   (!) 172/123 90 (!) 22 98.2 °F (36.8 °C) 99 %      MAP       --                   ALLERGIES    Review of patient's allergies indicates:  No Known Allergies    PROVIDER TRIAGE NOTE  32 yo F to the ED with back pain and flank pain.  Patient reports having a miscarriage back in early April.  She had an ultrasound done on April 19th which revealed possible retained products.  She had a follow-up with her OBGYN.  No further management was done regarding the possible retained products.  She was also diagnosed with the UTI and has completed 2 courses of antibiotics.  She reports persistent UTI related symptoms with associated back pain.  Vital signs appear normal.  Afebrile.      ORDERS  Labs Reviewed   URINALYSIS, REFLEX TO URINE CULTURE   POCT URINE PREGNANCY       ED Orders (720h ago, onward)    Start Ordered     Status Ordering Provider    05/26/22 2018 05/26/22 2017  EKG 12-lead  Once         Ordered JOSE MG    05/26/22 2018 05/26/22 2017  Urinalysis, Reflex to Urine Culture  STAT         Ordered JOSE MG    05/26/22 2018 05/26/22 2017  POCT urine pregnancy  Once         Ordered JOSE MG            Virtual Visit Note: The provider triage portion of this emergency department evaluation and documentation was performed via [employer], a HIPAA-compliant telemedicine application, in concert with a tele-presenter in the room. A face to face patient evaluation with one of my colleagues will occur once the patient is placed in an emergency department room.      DISCLAIMER: This note was prepared with M*Modal voice  recognition transcription software. Garbled syntax, mangled pronouns, and other bizarre constructions may be attributed to that software system.

## 2022-06-01 ENCOUNTER — PATIENT OUTREACH (OUTPATIENT)
Dept: EMERGENCY MEDICINE | Facility: HOSPITAL | Age: 32
End: 2022-06-01

## 2022-06-07 ENCOUNTER — HOSPITAL ENCOUNTER (EMERGENCY)
Facility: HOSPITAL | Age: 32
Discharge: HOME | End: 2022-06-07
Attending: EMERGENCY MEDICINE
Payer: MEDICAID

## 2022-06-07 VITALS
BODY MASS INDEX: 40.16 KG/M2 | HEIGHT: 68 IN | DIASTOLIC BLOOD PRESSURE: 63 MMHG | HEART RATE: 72 BPM | WEIGHT: 265 LBS | SYSTOLIC BLOOD PRESSURE: 123 MMHG | OXYGEN SATURATION: 98 % | TEMPERATURE: 98 F | RESPIRATION RATE: 14 BRPM

## 2022-06-07 DIAGNOSIS — K21.9 GASTROESOPHAGEAL REFLUX DISEASE, UNSPECIFIED WHETHER ESOPHAGITIS PRESENT: ICD-10-CM

## 2022-06-07 DIAGNOSIS — R07.9 CHEST PAIN: ICD-10-CM

## 2022-06-07 DIAGNOSIS — R52 PAIN: ICD-10-CM

## 2022-06-07 DIAGNOSIS — M70.52 SUPRAPATELLAR BURSITIS OF LEFT KNEE: Primary | ICD-10-CM

## 2022-06-07 LAB
ALBUMIN SERPL BCP-MCNC: 4.4 G/DL (ref 3.5–5.2)
ALP SERPL-CCNC: 76 U/L (ref 55–135)
ALT SERPL W/O P-5'-P-CCNC: 14 U/L (ref 10–44)
ANION GAP SERPL CALC-SCNC: 9 MMOL/L (ref 8–16)
AST SERPL-CCNC: 20 U/L (ref 10–40)
BASOPHILS # BLD AUTO: 0.03 K/UL (ref 0–0.2)
BASOPHILS NFR BLD: 0.4 % (ref 0–1.9)
BILIRUB SERPL-MCNC: 0.3 MG/DL (ref 0.1–1)
BILIRUB UR QL STRIP: NEGATIVE
BUN SERPL-MCNC: 8 MG/DL (ref 6–20)
CALCIUM SERPL-MCNC: 9.3 MG/DL (ref 8.7–10.5)
CHLORIDE SERPL-SCNC: 102 MMOL/L (ref 95–110)
CLARITY UR: CLEAR
CO2 SERPL-SCNC: 23 MMOL/L (ref 23–29)
COLOR UR: COLORLESS
CREAT SERPL-MCNC: 0.6 MG/DL (ref 0.5–1.4)
DIFFERENTIAL METHOD: ABNORMAL
EOSINOPHIL # BLD AUTO: 0.2 K/UL (ref 0–0.5)
EOSINOPHIL NFR BLD: 2.5 % (ref 0–8)
ERYTHROCYTE [DISTWIDTH] IN BLOOD BY AUTOMATED COUNT: 13.9 % (ref 11.5–14.5)
EST. GFR  (AFRICAN AMERICAN): >60 ML/MIN/1.73 M^2
EST. GFR  (NON AFRICAN AMERICAN): >60 ML/MIN/1.73 M^2
GLUCOSE SERPL-MCNC: 107 MG/DL (ref 70–110)
GLUCOSE UR QL STRIP: NEGATIVE
HCT VFR BLD AUTO: 37 % (ref 37–48.5)
HGB BLD-MCNC: 11.8 G/DL (ref 12–16)
HGB UR QL STRIP: NEGATIVE
IMM GRANULOCYTES # BLD AUTO: 0.03 K/UL (ref 0–0.04)
IMM GRANULOCYTES NFR BLD AUTO: 0.4 % (ref 0–0.5)
KETONES UR QL STRIP: NEGATIVE
LEUKOCYTE ESTERASE UR QL STRIP: NEGATIVE
LYMPHOCYTES # BLD AUTO: 1.4 K/UL (ref 1–4.8)
LYMPHOCYTES NFR BLD: 21.1 % (ref 18–48)
MCH RBC QN AUTO: 26.8 PG (ref 27–31)
MCHC RBC AUTO-ENTMCNC: 31.9 G/DL (ref 32–36)
MCV RBC AUTO: 84 FL (ref 82–98)
MONOCYTES # BLD AUTO: 0.4 K/UL (ref 0.3–1)
MONOCYTES NFR BLD: 6.5 % (ref 4–15)
NEUTROPHILS # BLD AUTO: 4.7 K/UL (ref 1.8–7.7)
NEUTROPHILS NFR BLD: 69.1 % (ref 38–73)
NITRITE UR QL STRIP: NEGATIVE
NRBC BLD-RTO: 0 /100 WBC
PH UR STRIP: 7 [PH] (ref 5–8)
PLATELET # BLD AUTO: 371 K/UL (ref 150–450)
PMV BLD AUTO: 9 FL (ref 9.2–12.9)
POTASSIUM SERPL-SCNC: 3.3 MMOL/L (ref 3.5–5.1)
PROT SERPL-MCNC: 8.5 G/DL (ref 6–8.4)
PROT UR QL STRIP: NEGATIVE
RBC # BLD AUTO: 4.4 M/UL (ref 4–5.4)
SODIUM SERPL-SCNC: 134 MMOL/L (ref 136–145)
SP GR UR STRIP: 1 (ref 1–1.03)
TROPONIN I SERPL DL<=0.01 NG/ML-MCNC: <0.03 NG/ML
URN SPEC COLLECT METH UR: ABNORMAL
UROBILINOGEN UR STRIP-ACNC: NEGATIVE EU/DL
WBC # BLD AUTO: 6.78 K/UL (ref 3.9–12.7)

## 2022-06-07 PROCEDURE — 80053 COMPREHEN METABOLIC PANEL: CPT | Performed by: NURSE PRACTITIONER

## 2022-06-07 PROCEDURE — 25000003 PHARM REV CODE 250: Performed by: NURSE PRACTITIONER

## 2022-06-07 PROCEDURE — 93010 ELECTROCARDIOGRAM REPORT: CPT | Mod: ,,, | Performed by: INTERNAL MEDICINE

## 2022-06-07 PROCEDURE — 84484 ASSAY OF TROPONIN QUANT: CPT | Performed by: NURSE PRACTITIONER

## 2022-06-07 PROCEDURE — 96374 THER/PROPH/DIAG INJ IV PUSH: CPT

## 2022-06-07 PROCEDURE — 36415 COLL VENOUS BLD VENIPUNCTURE: CPT | Performed by: NURSE PRACTITIONER

## 2022-06-07 PROCEDURE — 96361 HYDRATE IV INFUSION ADD-ON: CPT

## 2022-06-07 PROCEDURE — 99285 EMERGENCY DEPT VISIT HI MDM: CPT | Mod: 25

## 2022-06-07 PROCEDURE — 85025 COMPLETE CBC W/AUTO DIFF WBC: CPT | Performed by: NURSE PRACTITIONER

## 2022-06-07 PROCEDURE — 81003 URINALYSIS AUTO W/O SCOPE: CPT | Performed by: NURSE PRACTITIONER

## 2022-06-07 PROCEDURE — 93010 EKG 12-LEAD: ICD-10-PCS | Mod: ,,, | Performed by: INTERNAL MEDICINE

## 2022-06-07 PROCEDURE — 93005 ELECTROCARDIOGRAM TRACING: CPT | Performed by: INTERNAL MEDICINE

## 2022-06-07 PROCEDURE — 63600175 PHARM REV CODE 636 W HCPCS: Performed by: NURSE PRACTITIONER

## 2022-06-07 RX ORDER — KETOROLAC TROMETHAMINE 30 MG/ML
15 INJECTION, SOLUTION INTRAMUSCULAR; INTRAVENOUS
Status: COMPLETED | OUTPATIENT
Start: 2022-06-07 | End: 2022-06-07

## 2022-06-07 RX ORDER — LIDOCAINE HYDROCHLORIDE 20 MG/ML
10 SOLUTION OROPHARYNGEAL ONCE
Status: COMPLETED | OUTPATIENT
Start: 2022-06-07 | End: 2022-06-07

## 2022-06-07 RX ORDER — OMEPRAZOLE 20 MG/1
40 CAPSULE, DELAYED RELEASE ORAL DAILY
Qty: 60 CAPSULE | Refills: 0 | Status: SHIPPED | OUTPATIENT
Start: 2022-06-07 | End: 2022-07-07

## 2022-06-07 RX ORDER — MELOXICAM 15 MG/1
15 TABLET ORAL DAILY
Qty: 10 TABLET | Refills: 0 | Status: SHIPPED | OUTPATIENT
Start: 2022-06-07 | End: 2022-06-17

## 2022-06-07 RX ORDER — MAG HYDROX/ALUMINUM HYD/SIMETH 200-200-20
30 SUSPENSION, ORAL (FINAL DOSE FORM) ORAL ONCE
Status: COMPLETED | OUTPATIENT
Start: 2022-06-07 | End: 2022-06-07

## 2022-06-07 RX ADMIN — SODIUM CHLORIDE, SODIUM LACTATE, POTASSIUM CHLORIDE, AND CALCIUM CHLORIDE 1000 ML: .6; .31; .03; .02 INJECTION, SOLUTION INTRAVENOUS at 04:06

## 2022-06-07 RX ADMIN — ALUMINA, MAGNESIA, AND SIMETHICONE ORAL SUSPENSION REGULAR STRENGTH 30 ML: 1200; 1200; 120 SUSPENSION ORAL at 04:06

## 2022-06-07 RX ADMIN — KETOROLAC TROMETHAMINE 15 MG: 30 INJECTION, SOLUTION INTRAMUSCULAR at 04:06

## 2022-06-07 RX ADMIN — LIDOCAINE HYDROCHLORIDE 10 ML: 20 SOLUTION ORAL; TOPICAL at 04:06

## 2022-06-07 NOTE — ED PROVIDER NOTES
Source of History:  Patient, chart    Chief complaint:  Chest Pain (Chest pain w/ sob) and Knee Pain (Bilat knee pain)      HPI:  Katharine Chavez is a 31 y.o. female presenting with left-sided knee pain, chest tightness with burning as well as upper epigastric abdominal pain and burning.  Patient states symptoms have been intermittent for the past few weeks.  Patient states she is from [place] and does not have a primary care physician here.  Patient states she has a history of anxiety but is close to running out of her Xanax.  Patient states she took a dose of Xanax this morning for her symptoms but symptoms continued.  Patient states I am tired of coming back here you need to tell me what is wrong.  Patient denies any associated nausea or vomiting.    This is the extent to the patients complaints today here in the emergency department.    ROS: As per HPI and below:    Constitutional: No fever.  No chills.  Eyes: No visual changes.  ENT: No sore throat. No ear pain    Cardiovascular:  Positive for chest pain.  Respiratory:  Positive for shortness of breath.  GI:  Positive for abdominal pain.  No nausea or vomiting.  Genitourinary: No abnormal urination.  Neurologic: No headache. No focal weakness.  No numbness.  MSK: no back pain.  Positive for left knee pain  Integument: No rashes or lesions.  Hematologic: No easy bruising.  Endocrine: No excessive thirst or urination.    Review of patient's allergies indicates:  No Known Allergies    PMH:  As per HPI and below:  Past Medical History:   Diagnosis Date    Anxiety      Past Surgical History:   Procedure Laterality Date    INCISION AND DRAINAGE OF WOUND      bilateral auxillary       Social History     Tobacco Use    Smoking status: Never Smoker   Substance Use Topics    Alcohol use: Yes     Comment: occasionally    Drug use: No       Physical Exam:    BP (!) 123/100 (BP Location: Left arm, Patient Position: Sitting)   Pulse 96   Temp 98.2 °F (36.8 °C)  "(Oral)   Resp (!) 22   Ht 5' 8" (1.727 m)   Wt 120.2 kg (265 lb)   SpO2 100%   BMI 40.29 kg/m²     Nursing note and vital signs reviewed.    Constitutional: No acute distress.  Nontoxic.  Obese  Eyes: No conjunctival injection.Extraocular muscles are intact.  ENT: Oropharynx clear.  Normal phonation.  Mucous membranes pink and moist.  Speaking in full sentences.  Cardiovascular: Regular rate and rhythm.  No murmurs. No gallops. No rubs  Respiratory: Clear to auscultation bilaterally.  Good air movement.  No wheezes.  No rhonchi. No rales. No accessory muscle use..  Abdomen: Soft.  Protuberant but not distended.  Nontender.  No guarding.  No rebound. Non-peritoneal.  Musculoskeletal: Good range of motion all joints.  Decreased active range of motion to left knee.  Patient states knee feels tight.  Good passive range of motion on exam.  No deformities.  Neck supple.  No meningismus.  Plus two DP noted to left lower extremity  Skin: No rashes seen.  Good turgor.  No abrasions.  No ecchymoses.  Neurologic: Motor intact.  Sensation intact.  No ataxia. No focal neurological deficits.  Psych: Appropriate, conversant    Labs/Imaging that has been ordered has been reviewed by myself.    I decided to obtain the patient's medical records.  Summary of Medical Records:  Seen in the past for similar complaints.    MDM/ Differential Dx:    Emergent evaluation of an obese 31-year-old female presenting for left knee pain and generalized body tightness with associated chest pain, upper epigastric pain and shortness of breath.  Patient states she had an anxiety attack this morning and took a small amount of Xanax but symptoms did not completely resolved.  Patient does not have a PCP here and is almost out of her Xanax so has been rationing it.  Patient states yesterday she started with left knee pain and feels like her knee is loose.  Patient denies any falls or trauma.   On exam pt is A&Ox3. VSS. Nonfebrile and nontoxic " appearing.  Speaking in full sentences.  Breath sounds clear bilaterally. Mucous membranes pink and moist. Tonsils with no redness, erythema or exudates. Abdomen soft and nontender. No rebound or guarding appreciated on exam.   BS WNL.  Good range of motion all joints.  Decreased active range of motion to left knee.  Patient states knee feels tight.  Good passive range of motion on exam.  No deformities.  Neck supple.  No meningismus.  Plus two DP noted to left lower extremity Cap refill < 3 seconds.      Differential diagnoses include but are not limited to anxiety, arthritis, GERD, ischemic chest pain, angina, COPD, asthma, bronchitis, pneumonia, UTI, DVT, musculoskeletal pain, strain, others      I will get labs, imaging, medicate and reassess.    ED Course as of 06/07/22 1803   Tue Jun 07, 2022   1656 CBC unremarkable.  No leukocytosis noted.  H&H stable 11.8 and 37.  CMP shows slight hypokalemia at 3.3.  UA negative for any acute infectious process.  Troponin negative.  Chest x-ray negative.  Knee x-ray negative.  Awaiting ultrasound. [RZ]   1801 Ultrasound negative for any acute abnormalities.  Suprapatellar fluid noted.  Advised that we will apply an Ace wrap for compression.  Patient advised to take medications as prescribed.  Patient states she has been on multiple antibiotics recently.  Advised patient that this could be cause of stomach irritation and burning.  Advised to start taking Prilosec daily.  Increase fluids and bland diet.  Follow-up with PCP as needed.  Patient verbalized understanding of this plan of care.  All questions and concerns addressed. [RZ]   1801 Patient is hemodynamically stable, vital signs are normal. Discharge instructions given. Return to ED precautions discussed. Follow up as directed. Pt verbalized understanding of this plan.  Pt is stable for discharge.  [RZ]      ED Course User Index  [RZ] Le Pruett NP                 Diagnostic Impression:    1. Suprapatellar  bursitis of left knee    2. Chest pain    3. Pain    4. Gastroesophageal reflux disease, unspecified whether esophagitis present         ED Disposition Condition    Discharge Stable          ED Prescriptions     Medication Sig Dispense Start Date End Date Auth. Provider    omeprazole (PRILOSEC) 20 MG capsule Take 2 capsules (40 mg total) by mouth once daily. 60 capsule 6/7/2022 7/7/2022 Le Pruett NP    meloxicam (MOBIC) 15 MG tablet Take 1 tablet (15 mg total) by mouth once daily. for 10 days 10 tablet 6/7/2022 6/17/2022 Le Pruett NP        Follow-up Information     Follow up With Specialties Details Why Contact Info    [facility]  Schedule an appointment as soon as possible for a visit  as needed [facility] [address]  [phone]               Le Pruett NP  06/07/22 1800

## 2022-06-07 NOTE — DISCHARGE INSTRUCTIONS
Your imaging shows that you may have a slight bursitis in your left knee.  We have applied an ace wrap for compression.      Suprapatellar bursitis treatment  - Treatment of suprapatellar bursitis can include:   resting and avoiding activities that could irritate the area, such as kneeling, jumping, or running  taking over-the-counter (OTC) pain medication such as ibuprofen (Motrin, Advil) and acetaminophen (Tylenol) to help relieve pain and swelling  applying an ice pack to the area to ease swelling   using an Ace wrap to stabilize and limit movement of the area    We have also prescribed you Prilosec for acid reflux.  Avoid fried or spicy foods.  Follow up with PCP as needed.      Thank you for coming to our Emergency Department today. It is important to remember that some problems are difficult to diagnose and may not be found during your Emergency Department visit.     Be sure to follow up with your primary care doctor and review all labs/imaging/tests that were performed during this visit with them. Some labs/tests may be outside of the normal range and require non-emergent follow-up and further investigation to help diagnose/exclude/prevent complications or other medical conditions.    If you do not have a primary care doctor, you may contact the one listed on your discharge paperwork or you may also call the [facility] Appointment Desk at [phone] to schedule an appointment and establish care with one. It is important to your health that you have a primary care doctor.    Please take all medications as directed. All medications may potentially have side-effects and it is impossible to predict which medications may give you side-effects or what side-effects (if any) they will give you.. If you feel that you are having a negative effect or side-effect of any medication you should immediately stop taking them and seek medical attention. If you feel that you are having a life-threatening reaction call  [phone].    Return to the ER with any questions/concerns, new/concerning symptoms, worsening or failure to improve.     Do not drive, swim, climb to height, take a bath or make any important decisions for 24 hours if you have received any pain medications, sedatives or mood altering drugs during your ER visit.

## 2023-03-28 ENCOUNTER — PATIENT MESSAGE (OUTPATIENT)
Dept: RESEARCH | Facility: HOSPITAL | Age: 33
End: 2023-03-28

## 2023-04-04 ENCOUNTER — PATIENT MESSAGE (OUTPATIENT)
Dept: RESEARCH | Facility: HOSPITAL | Age: 33
End: 2023-04-04